# Patient Record
Sex: MALE | Race: WHITE | NOT HISPANIC OR LATINO | Employment: FULL TIME | URBAN - METROPOLITAN AREA
[De-identification: names, ages, dates, MRNs, and addresses within clinical notes are randomized per-mention and may not be internally consistent; named-entity substitution may affect disease eponyms.]

---

## 2022-08-03 ENCOUNTER — OFFICE VISIT (OUTPATIENT)
Dept: INTERNAL MEDICINE CLINIC | Facility: CLINIC | Age: 57
End: 2022-08-03
Payer: COMMERCIAL

## 2022-08-03 ENCOUNTER — TELEPHONE (OUTPATIENT)
Dept: INTERNAL MEDICINE CLINIC | Facility: CLINIC | Age: 57
End: 2022-08-03

## 2022-08-03 VITALS
BODY MASS INDEX: 28.55 KG/M2 | OXYGEN SATURATION: 100 % | SYSTOLIC BLOOD PRESSURE: 138 MMHG | DIASTOLIC BLOOD PRESSURE: 82 MMHG | WEIGHT: 215.4 LBS | HEART RATE: 67 BPM | HEIGHT: 73 IN | TEMPERATURE: 98.6 F

## 2022-08-03 DIAGNOSIS — D17.1 LIPOMA OF BACK: ICD-10-CM

## 2022-08-03 DIAGNOSIS — I87.2 VENOUS INSUFFICIENCY: ICD-10-CM

## 2022-08-03 DIAGNOSIS — I87.2 VENOUS INSUFFICIENCY: Primary | ICD-10-CM

## 2022-08-03 DIAGNOSIS — I83.93 ASYMPTOMATIC VARICOSE VEINS OF BOTH LOWER EXTREMITIES: ICD-10-CM

## 2022-08-03 DIAGNOSIS — I87.8 VENOUS STASIS OF BOTH LOWER EXTREMITIES: Primary | ICD-10-CM

## 2022-08-03 PROCEDURE — 99203 OFFICE O/P NEW LOW 30 MIN: CPT | Performed by: INTERNAL MEDICINE

## 2022-08-03 PROCEDURE — 3725F SCREEN DEPRESSION PERFORMED: CPT | Performed by: INTERNAL MEDICINE

## 2022-08-03 RX ORDER — PETROLATUM 0.61 G/G
CREAM TOPICAL DAILY
Qty: 397 G | Refills: 0 | Status: SHIPPED | OUTPATIENT
Start: 2022-08-03

## 2022-08-03 NOTE — ASSESSMENT & PLAN NOTE
Patient reports long history of 30+ years of Varicose Veins   Was evaluated by PCP at that time, no intervention  Denies pain       Plan-  Recommended compression stockings, instructed to wear upon awakening and remove before bed  Elevate legs when resting

## 2022-08-03 NOTE — ASSESSMENT & PLAN NOTE
Skin changes to the medial malleolus, darkening purple in color, sensation changes described as intermittent numbness  Denies ulcerations, pain, dry skin       Plan-  Recommended Eucerin cream daily to affected areas before the use of compression stockings  See Varicose Veins plan

## 2022-08-03 NOTE — PATIENT INSTRUCTIONS
Lipoma   WHAT YOU NEED TO KNOW:   A lipoma is a benign (non cancer) tumor made up of fat tissue  Lipomas can form anywhere in your body, but are usually found on the back, shoulders, neck, and head  The cause of lipomas is unknown  A lipoma looks like a round lump of tissue  It may feel soft and rubbery  Lipomas move around underneath your skin when you press on them  They usually do not hurt  If your lipoma grows large, it may cause pain  DISCHARGE INSTRUCTIONS:   Contact your healthcare provider if:   Your lipoma is getting bigger  Your lipoma causes new or increased pain  You develop new symptoms  You have questions or concerns about your condition or care  Follow up with your healthcare provider as directed: Your healthcare provider may recommend regular follow-up visits to check the lipoma for any changes  You may need tests if your lipoma becomes painful or changes in size or color  Write down your questions so you remember to ask them during your visits  © Copyright [employer] 2022 Information is for End User's use only and may not be sold, redistributed or otherwise used for commercial purposes  All illustrations and images included in CareNotes® are the copyrighted property of [employer]  or Angelina Patino   The above information is an  only  It is not intended as medical advice for individual conditions or treatments  Talk to your doctor, nurse or pharmacist before following any medical regimen to see if it is safe and effective for you

## 2022-08-03 NOTE — ASSESSMENT & PLAN NOTE
Long history of 10 years of L back lump   Denies changes in shape, pain, color changes, decreased shoulder rom         Plan-   Monitor for size changes or experiencing pain   If patient has more concerns, Surgery consult can be placed  Low suspicion for any other diagnosis other than Lipoma at this time

## 2022-08-03 NOTE — PROGRESS NOTES
INTERNAL MEDICINE INITIAL OFFICE VISIT  [facility] - [facility] INTERNAL MEDICINE [facility]    NAME: La Thomson  AGE: 55 y o   SEX: male  : 1965     DATE: 8/3/2022     Assessment and Plan:     Problem List Items Addressed This Visit        Cardiovascular and Mediastinum    Asymptomatic varicose veins of both lower extremities     Patient reports long history of 30+ years of Varicose Veins   Was evaluated by PCP at that time, no intervention  Denies pain       Plan-  Recommended compression stockings, instructed to wear upon awakening and remove before bed  Elevate legs when resting          Venous stasis of both lower extremities - Primary     Long history of Varicose Veins   R leg > L leg       Plan-   See Venous Insufficiency Plan          Relevant Medications    Skin Protectants, Misc  (eucerin) cream    Other Relevant Orders    Compression Stocking    Venous insufficiency     Skin changes to the medial malleolus, darkening purple in color, sensation changes described as intermittent numbness  Denies ulcerations, pain, dry skin       Plan-  Recommended Eucerin cream daily to affected areas before the use of compression stockings  See Varicose Veins plan              Other    Lipoma of back     Long history of 10 years of L back lump   Denies changes in shape, pain, color changes, decreased shoulder rom         Plan-   Monitor for size changes or experiencing pain   If patient has more concerns, Surgery consult can be placed  Low suspicion for any other diagnosis other than Lipoma at this time         BMI 28 0-28 9,adult     Current BMI 28 42  Has not seen a PCP in a few years       Plan-  Annual Physical in 1 month   Review labs- CBC, CMP, Lipid Panel, HbA1c, TSH  Recommended healthy lifestyle choices with diet and exercise         Relevant Orders    CBC and differential    Lipid Panel with Direct LDL reflex    Hemoglobin A1C    TSH, 3rd generation with Free T4 reflex    Comprehensive metabolic panel          Return in about 4 weeks (around 8/31/2022) for Annual physical      Chief Complaint:     Chief Complaint   Patient presents with   [address] on left shoulder for the past 10 years      History of Present Illness:     Patient was seen and examined in the office  Patient reported that he has not seen his PCP in a few years and would like to establish care as a new patient  Patient has a pmh of varicose veins for 30 years more prominent in the R leg  Patient stated that he has been experiencing R medial ankle skin color changes described as purple, intermittent swelling of the R foot, and sensation changes of intermittent numbness  Patient denies ulceration, pain, or any other sx  Patient was also concerned about a L lump on the back, which has been present for the past 10 years  Patient denies any changes in size, pain with rom or pain at all, redness, open skin, or any other sx at this time  The following portions of the patient's history were reviewed and updated as appropriate: allergies, current medications, past family history, past medical history, past social history, past surgical history and problem list      Review of Systems:     Review of Systems   Constitutional: Negative for chills and fever  HENT: Negative for ear pain and sore throat  Respiratory: Negative for cough, chest tightness and shortness of breath  Cardiovascular: Negative for chest pain and palpitations  Gastrointestinal: Negative for abdominal pain, diarrhea, nausea and vomiting  Genitourinary: Negative for dysuria, frequency, hematuria and urgency  Musculoskeletal: Negative for arthralgias and back pain  Skin: Positive for color change  Negative for rash  R medial ankle, purple in color  Denies ulceration, pain, dry skin  Varicose veins to the R leg  L lump on the left side of back  No changes in size or pain      Neurological: Negative for seizures and syncope  Psychiatric/Behavioral: Negative for confusion  All other systems reviewed and are negative  Past Medical History:     Past Medical History:   Diagnosis Date    Asymptomatic varicose veins of bilateral lower extremities 1987        Past Surgical History:   History reviewed  No pertinent surgical history  Sweat gland removal over 10 years ago     Social History:   He reports that he has never smoked  He has never used smokeless tobacco  He reports current alcohol use of about 20 0 standard drinks of alcohol per week  He reports that he does not use drugs  Family History:     Family History   Problem Relation Age of Onset    Diabetes Father     Diabetes Paternal Grandfather         Current Medications:     Current Outpatient Medications:     Skin Protectants, Misc  (eucerin) cream, Apply topically in the morning, Disp: 397 g, Rfl: 0     Allergies:   No Known Allergies     Physical Exam:     /82 (BP Location: Left arm, Patient Position: Sitting, Cuff Size: Standard)   Pulse 67   Temp 98 6 °F (37 °C) (Tympanic)   Ht 6' 1" (1 854 m)   Wt 97 7 kg (215 lb 6 4 oz)   SpO2 100%   BMI 28 42 kg/m²     Physical Exam  Constitutional:       General: He is not in acute distress  Appearance: Normal appearance  He is not toxic-appearing  HENT:      Head: Normocephalic and atraumatic  Mouth/Throat:      Mouth: Mucous membranes are moist    Eyes:      Extraocular Movements: Extraocular movements intact  Pupils: Pupils are equal, round, and reactive to light  Cardiovascular:      Rate and Rhythm: Normal rate and regular rhythm  Pulses: Normal pulses  Heart sounds: Normal heart sounds  Pulmonary:      Effort: Pulmonary effort is normal       Breath sounds: Normal breath sounds  Abdominal:      General: Bowel sounds are normal  There is no distension  Palpations: Abdomen is soft  Tenderness: There is no abdominal tenderness     Musculoskeletal:      Cervical back: Normal range of motion  Right lower leg: No edema  Left lower leg: No edema  Skin:     General: Skin is warm  Comments: R medial malleolus skin color changes, purple  No ulcerations or tenderness to palpation  Bilateral varicose veins R leg> L Leg, no tenderness to palpation    Neurological:      General: No focal deficit present  Mental Status: He is alert and oriented to person, place, and time  Motor: No weakness     Psychiatric:         Mood and Affect: Mood normal          Behavior: Behavior normal           Data:     Laboratory Results: None completed for this inital visit   Radiology/Other Diagnostic Testing Results: None completed prior to this intial visit    Ramy Head MD  [id number] [facility] [address]

## 2022-08-03 NOTE — TELEPHONE ENCOUNTER
Pt went to [facility] to get compression stockings , they need the order changed and has to have  cardio sign the script, not sure why dr Ayaz Olsen can not sign

## 2022-08-03 NOTE — ASSESSMENT & PLAN NOTE
Current BMI 28 42  Has not seen a PCP in a few years       Plan-  Annual Physical in 1 month   Review labs- CBC, CMP, Lipid Panel, HbA1c, TSH  Recommended healthy lifestyle choices with diet and exercise

## 2022-08-18 ENCOUNTER — TELEPHONE (OUTPATIENT)
Dept: INTERNAL MEDICINE CLINIC | Facility: CLINIC | Age: 57
End: 2022-08-18

## 2022-08-18 NOTE — TELEPHONE ENCOUNTER
I LM for Angie Mckeon to verify if he received the cream ordered and to let him know the cream OTC would be eucerin  It appears it transcribed to pharmacy as stephanie moist barrier cream but this product is off the market according to the pharamcy message we received  Please offer eucerin OTC

## 2022-09-13 ENCOUNTER — OFFICE VISIT (OUTPATIENT)
Dept: INTERNAL MEDICINE CLINIC | Facility: CLINIC | Age: 57
End: 2022-09-13
Payer: COMMERCIAL

## 2022-09-13 VITALS
DIASTOLIC BLOOD PRESSURE: 78 MMHG | OXYGEN SATURATION: 97 % | BODY MASS INDEX: 28.65 KG/M2 | HEART RATE: 69 BPM | SYSTOLIC BLOOD PRESSURE: 120 MMHG | WEIGHT: 216.2 LBS | TEMPERATURE: 97.8 F | HEIGHT: 73 IN

## 2022-09-13 DIAGNOSIS — Z12.5 SCREENING FOR PROSTATE CANCER: ICD-10-CM

## 2022-09-13 DIAGNOSIS — Z12.11 SCREENING FOR COLON CANCER: ICD-10-CM

## 2022-09-13 DIAGNOSIS — Z11.59 ENCOUNTER FOR HEPATITIS C SCREENING TEST FOR LOW RISK PATIENT: ICD-10-CM

## 2022-09-13 DIAGNOSIS — I83.893 SYMPTOMATIC VARICOSE VEINS, BILATERAL: ICD-10-CM

## 2022-09-13 DIAGNOSIS — Z11.4 ENCOUNTER FOR SCREENING FOR HIV: ICD-10-CM

## 2022-09-13 DIAGNOSIS — Z23 NEED FOR TDAP VACCINATION: ICD-10-CM

## 2022-09-13 DIAGNOSIS — Z00.00 ANNUAL PHYSICAL EXAM: Primary | ICD-10-CM

## 2022-09-13 PROCEDURE — 90471 IMMUNIZATION ADMIN: CPT

## 2022-09-13 PROCEDURE — 3725F SCREEN DEPRESSION PERFORMED: CPT | Performed by: INTERNAL MEDICINE

## 2022-09-13 PROCEDURE — 99396 PREV VISIT EST AGE 40-64: CPT | Performed by: INTERNAL MEDICINE

## 2022-09-13 PROCEDURE — 90715 TDAP VACCINE 7 YRS/> IM: CPT

## 2022-09-13 NOTE — ASSESSMENT & PLAN NOTE
Patient with 30 year history of bilateral varicose veins, more prominent on the right leg  He reports worsening of varicose veins over the years  Right leg significantly more affected  Right leg more swollen than the left  Patient denies any pain in his right leg or discoloration  Do not suspect DVT at this time due to right leg being more affected by varicose veins and venous stasis than the left leg  Discussed with patient the need to come back if he ever feels pain in his legs or any purplish, bulging, or discoloration of his right leg more than the left  Recommended to the patient that he should continue using compression stockings and remain more physically active  Will refer the patient to vascular surgery for further management

## 2022-09-13 NOTE — PROGRESS NOTES
INTERNAL MEDICINE HEALTH MAINTENANCE OFFICE VISIT  [facility] - [facility] INTERNAL MEDICINE [facility]    NAME: Marlena Thomson  AGE: 55 y o  SEX: male  : 1965     DATE: 2022     Assessment and Plan:     1  Annual physical exam    2  Need for Tdap vaccination  -     TDAP VACCINE GREATER THAN OR EQUAL TO 6YO IM    3  Symptomatic varicose veins, bilateral  Assessment & Plan:  Patient with 30 year history of bilateral varicose veins, more prominent on the right leg  He reports worsening of varicose veins over the years  Right leg significantly more affected  Right leg more swollen than the left  Patient denies any pain in his right leg or discoloration  Do not suspect DVT at this time due to right leg being more affected by varicose veins and venous stasis than the left leg  Discussed with patient the need to come back if he ever feels pain in his legs or any purplish, bulging, or discoloration of his right leg more than the left  Recommended to the patient that he should continue using compression stockings and remain more physically active  Will refer the patient to vascular surgery for further management  Orders:  -     Ambulatory Referral to Vascular Surgery; Future    4  Screening for prostate cancer  -     PSA, total and free; Future    5  Screening for colon cancer  -     Ambulatory referral for colonoscopy; Future    6  Encounter for screening for HIV  -     HIV 1/2 Antigen/Antibody (4th Generation) w Reflex [facility]; Future    7  Encounter for hepatitis C screening test for low risk patient  -     Hepatitis C antibody; Future      1  Patient's main concern was his bilateral varicose veins  2  Patient Counseling:  · Nutrition: Stressed importance of moderation in sodium/caffeine intake, saturated fat, trans fat and cholesterol  Discussed portion control as well as increasing intake of fruits, vegetables, lean protein, and fish     · Exercise: Stressed the importance of regular exercise at least 150 mins/week  · Sexuality: Discussed sexually transmitted diseases, partner selection, use of condoms, avoidance of unintended pregnancy  and contraceptive alternatives  · Injury prevention: Discussed safety belts, safety helmets, smoke detector, smoking near bedding or upholstery  · Dental health: Discussed importance of regular tooth brushing, flossing, and dental visits  · Immunizations reviewed  Will give Tdap vaccine  · Discussed benefits of screening  · Education was printed for patient    3  Discussed the patient's BMI with him  The BMI is above average; BMI management plan is completed    No follow-ups on file  Chief Complaint:     Chief Complaint   Patient presents with    Annual Exam        History of Present Illness: Well Adult Physical   Patient here for a comprehensive physical exam The patient reports problems - Bilateral venous stasis and varicose veins    Diet and Physical Activity  Diet: well balanced diet  Weight concerns: Patient is overweight (BMI 25 0-29  9)  Exercise: occasionally      Depression Screen  Negative for depression with PHQ2 score of 0  General Health  Hearing: Normal:  bilateral  Vision: no vision problems  Dental: no dental visits for >1 year        The following portions of the patient's history were reviewed and updated as appropriate: allergies, current medications, past family history, past medical history, past social history, past surgical history and problem list      Review of Systems:     Review of Systems   Constitutional: Negative for activity change, appetite change, fatigue and unexpected weight change  HENT: Negative for hearing loss  Eyes: Negative for visual disturbance  Respiratory: Negative for shortness of breath  Cardiovascular: Positive for leg swelling  Negative for chest pain and palpitations  Gastrointestinal: Negative for abdominal distention and abdominal pain     Genitourinary: Negative for difficulty urinating  Musculoskeletal: Negative for myalgias  Neurological: Negative for syncope and light-headedness  Past Medical History:     Past Medical History:   Diagnosis Date    Asymptomatic varicose veins of bilateral lower extremities 1987        Past Surgical History:     History reviewed  No pertinent surgical history  Social History:     Social History     Socioeconomic History    Marital status:      Spouse name: None    Number of children: None    Years of education: None    Highest education level: None   Occupational History    None   Tobacco Use    Smoking status: Never Smoker    Smokeless tobacco: Never Used   Vaping Use    Vaping Use: Never used   Substance and Sexual Activity    Alcohol use: Yes     Alcohol/week: 20 0 standard drinks     Types: 20 Cans of beer per week    Drug use: Never    Sexual activity: None   Other Topics Concern    None   Social History Narrative    None     Social Determinants of Health     Financial Resource Strain: Not on file   Food Insecurity: Not on file   Transportation Needs: Not on file   Physical Activity: Not on file   Stress: Not on file   Social Connections: Not on file   Intimate Partner Violence: Not on file   Housing Stability: Not on file        Family History:     Family History   Problem Relation Age of Onset    Diabetes Father     Diabetes Paternal Grandfather         Current Medications:     Outpatient Medications Prior to Visit   Medication Sig Dispense Refill    Skin Protectants, Misc  (eucerin) cream Apply topically in the morning 397 g 0     No facility-administered medications prior to visit  Allergies:     No Known Allergies     Objective:     Physical Exam:  General appearance: alert and oriented, in no acute distress  Head: Normocephalic, without obvious abnormality, atraumatic  Eyes: conjunctivae/corneas clear  PERRL, EOM's intact  Fundi benign    Ears: normal TM's and external ear canals both ears  Throat: abnormal findings: dentition: multiple carries  Lungs: clear to auscultation bilaterally  Heart: regular rate and rhythm, S1, S2 normal, no murmur, click, rub or gallop  Abdomen: normal findings: soft, non-tender  Extremities: varicose veins noted and venous stasis dermatitis noted  Pulses: 2+ and symmetric  Skin: varicosities - upper leg(s) bilateral, knee(s) bilateral, lower leg(s) bilateral, ankle(s) bilateral, feet bilateral    Data:    Laboratory Results: I have personally reviewed the pertinent laboratory results/reports   Radiology/Other Diagnostic Testing Results: I have personally reviewed pertinent reports         Health Maintenance:     Health Maintenance   Topic Date Due    Hepatitis C Screening  Never done    HIV Screening  Never done    BMI: Followup Plan  Never done    Annual Physical  Never done    Colorectal Cancer Screening  Never done    COVID-19 Vaccine (3 - Booster for María series) 04/15/2022    Influenza Vaccine (1) 09/01/2022    Depression Screening  08/03/2023    BMI: Adult  09/13/2023    DTaP,Tdap,and Td Vaccines (2 - Td or Tdap) 09/13/2032    Pneumococcal Vaccine: Pediatrics (0 to 5 Years) and At-Risk Patients (6 to 59 Years)  Aged Out    HIB Vaccine  Aged Out    Hepatitis B Vaccine  Aged Out    IPV Vaccine  Aged Out    Hepatitis A Vaccine  Aged Out    Meningococcal ACWY Vaccine  Aged Out    HPV Vaccine  Aged [employer] History   Administered Date(s) Administered    COVID-19 J&J (María) vaccine 0 5 mL 05/27/2021    COVID-19, unspecified 12/15/2021    Tdap 09/13/2022     Nutrition Assessment and Intervention:     Reviewed food recall journal      Physical Activity Assessment and Intervention:    Activity journal reviewed    Activity journal completion recommended    Physical Activity Prescription completed with patient      Other interventions: Biking or brisk walking 30 minutes 5 times a day    Emotional and Mental Well-being, Sleep, Connectedness Assessment and Intervention:    Depression and anxiety screening performed and reviewed    PHQ-9 or GAD7 performed for initial evaluation or follow-up      Tobacco and Toxic Substance Assessment and Intervention:     Tobacco use screening performed      Chandni Amaro MD  [facility] INTERNAL MEDICINE [address] [place] [address]

## 2022-09-13 NOTE — PROGRESS NOTES
[facility] INTERNAL MEDICINE [place]    NAME: Burton Moorehlrowan  AGE: 55 y o  SEX: male  : 1965     DATE: 2022     Assessment and Plan:     Problem List Items Addressed This Visit    None         Immunizations and preventive care screenings were discussed with patient today  Appropriate education was printed on patient's after visit summary  {Prostate cancer screening - consider in males between age of 43-69 depending on age, race, and risk factors  There is difference in the guidelines in regards to the optimal age for screening  USPSTF states to consider periodic screening in males between the age of 48 to 71  This text is informational and does not need to be selected but if you wish, you can insert standard documentation in your progress note by using F2 (Optional):43282}    Counseling:  · {Annual Physical; Counselin}         No follow-ups on file  Chief Complaint:     No chief complaint on file  History of Present Illness:     Adult Annual Physical   Patient here for a comprehensive physical exam  The patient reports {problems:77132}  Diet and Physical Activity  · Diet/Nutrition: {annual physical; diet:}  · Exercise: {annual physical; exercise:59719131}  Depression Screening  PHQ-2/9 Depression Screening         General Health  · Sleep: {annual physical; sleep:2102}  · Hearing: {annual physical; hearin}  · Vision: {annual physical; vision:}  · Dental: {annual physical; dental:}   Health  · Symptoms include: {annual physical; urinary symptoms:47870::"none"}     Review of Systems:     Review of Systems   Past Medical History:     Past Medical History:   Diagnosis Date    Asymptomatic varicose veins of bilateral lower extremities       Past Surgical History:     No past surgical history on file     Family History:     Family History   Problem Relation Age of Onset    Diabetes Father     Diabetes Paternal Grandfather       Social History:     Social History     Socioeconomic History    Marital status:      Spouse name: Not on file    Number of children: Not on file    Years of education: Not on file    Highest education level: Not on file   Occupational History    Not on file   Tobacco Use    Smoking status: Never Smoker    Smokeless tobacco: Never Used   Vaping Use    Vaping Use: Never used   Substance and Sexual Activity    Alcohol use: Yes     Alcohol/week: 20 0 standard drinks     Types: 20 Cans of beer per week    Drug use: Never    Sexual activity: Not on file   Other Topics Concern    Not on file   Social History Narrative    Not on file     Social Determinants of Health     Financial Resource Strain: Not on file   Food Insecurity: Not on file   Transportation Needs: Not on file   Physical Activity: Not on file   Stress: Not on file   Social Connections: Not on file   Intimate Partner Violence: Not on file   Housing Stability: Not on file      Current Medications:     Current Outpatient Medications   Medication Sig Dispense Refill    Skin Protectants, Misc  (eucerin) cream Apply topically in the morning 397 g 0     No current facility-administered medications for this visit  Allergies:     No Known Allergies   Physical Exam:     There were no vitals taken for this visit      Physical Exam     Aleksandr Pelletier MD  [facility] INTERNAL MEDICINE [place]

## 2022-10-18 ENCOUNTER — CONSULT (OUTPATIENT)
Dept: VASCULAR SURGERY | Facility: CLINIC | Age: 57
End: 2022-10-18
Payer: COMMERCIAL

## 2022-10-18 VITALS
HEIGHT: 73 IN | RESPIRATION RATE: 17 BRPM | DIASTOLIC BLOOD PRESSURE: 72 MMHG | WEIGHT: 211 LBS | BODY MASS INDEX: 27.96 KG/M2 | SYSTOLIC BLOOD PRESSURE: 124 MMHG | HEART RATE: 66 BPM

## 2022-10-18 DIAGNOSIS — I83.893 SYMPTOMATIC VARICOSE VEINS, BILATERAL: Primary | ICD-10-CM

## 2022-10-18 PROCEDURE — 99243 OFF/OP CNSLTJ NEW/EST LOW 30: CPT | Performed by: NURSE PRACTITIONER

## 2022-10-18 NOTE — PROGRESS NOTES
Assessment/Plan:   Problem List Items Addressed This Visit        Cardiovascular and Mediastinum    Symptomatic varicose veins, bilateral - Primary     72-year-old male without significant pertinent medical history presents with complaints of BLE dilated varicosities R>L x 30 years with RLE venous stasis changes  - patient reports 30 year history of RLE varicose veins  - patient has been wearing compression x 2 months   - RLE dilated truncal varicosities medial thigh to calf  - right medial foot with small dilated, purple varicosities impending rupture  - RLE venous stasis change  - no edema, drainage, or wound  - patient denies pain  - palpable distal pulses bilateral    Plan:   - conservative medical management with daily use of medical grade compression stockings 20-30 mmHg  On a m , off in p m    - frequent ambulation, leg elevation at rest  - bilateral LEVDR  - return to office with vascular surgeon after imaging to review and make further recommendations if indicated         Relevant Orders    VAS reflux lower limb venous duplex study with reflux assessment, complete bilateral           Diagnoses and all orders for this visit:    Symptomatic varicose veins, bilateral  -     Ambulatory Referral to Vascular Surgery  -     VAS reflux lower limb venous duplex study with reflux assessment, complete bilateral; Future    Other orders  -     Multiple Vitamins-Minerals (Multivitamin Adult, Minerals,) TABS; Take by mouth          Subjective:      Patient ID: Amita Oconnell is a 65 y o  male     Patient is new to our practice and was referred by Dr Sharona Goode  Pt c/o bulging veins in the RLE for the past 30 years  Pt states that his veins are getting worse  Pt denies pain, tiredness, heaviness, or itching  Pt denies hx of blood clots or bleeding veins  Pt has been wearing compression stockings for the past 2 months  Pt does elevate his legs        Chief Complaint: BLE varicose veins R>L      HPI  See assessment and plan  65yo M with varicose veins since age 26  He reports worsening over time  He recently relocated to the [facility] and with new job he stands for long hours of the day  Discussed pathophysiology of venous insufficiency and varicose veins  Recommend continued conservative management with daily use of medical grade compression stockings 20-30 mmHg  On a m , off in p m  Ovalles Umesh Frequent ambulation, leg elevation rest and diligent skin care  We will obtain lower extremity venous duplex with reflux and have patient return with vascular surgeon to review imaging and make further recommendations if indicated  Rahel Lauren is seen for evaluation of: [x]Varicose veins/legs  []Spider veins/legs  []Spider veins/face  []Venous stasis ulcer  []Superficial thrombophlebitis  []Other -      He complains of []none  [x]bulging veins  [x]dilated veins  []discolored veins         There is [x]no edema              []right leg edema  []left leg edema       []bilateral lower extremity edema     There is   []no leg pain          []right leg pain  []left leg pain         []bilateral leg pain  []bilateral leg pain; L>R   [x]bilateral leg pain; R>L     Pain is described as [x]aching              []itching  []sharp                []burning  []throbbing         []stinging  [x]heavy                []dull  []other -      Symptoms have been ongoing for:  30 years   There is  [x]no pertinent medical history  []history of DVT  []PE  []superficial venous thrombosis     Prior treatment includes []none  []EVLT  []OTC stockings  [x]prescription compression stockings  []vein ligation  []vein stripping  []stab phlebectomy  []sclerotherapy injections  []Other -      Current treatment includes []none  [x]compression socks  []avoiding tight clothing  []leg elevation  []rest  []exercise  []weight management  []skin care  []periodic evaluation   []Other-     Treatment has been []effective  [x]ineffective     Review of Systems   Constitutional: Negative  HENT: Negative  Eyes: Negative  Respiratory: Negative  Cardiovascular: Negative  Gastrointestinal: Negative  Endocrine: Negative  Genitourinary: Negative  Musculoskeletal: Negative  Skin: Negative  Allergic/Immunologic: Negative  Neurological: Negative  Hematological: Negative  Psychiatric/Behavioral: Negative  The following portions of the patient's history were reviewed and updated as appropriate: allergies, current medications, past family history, past medical history, past social history, past surgical history and problem list   Objective     Physical Exam  Vitals and nursing note reviewed  Constitutional:       Appearance: Normal appearance  He is normal weight  HENT:      Head: Normocephalic and atraumatic  Cardiovascular:      Rate and Rhythm: Normal rate  Pulses: Normal pulses  Dorsalis pedis pulses are 2+ on the right side and 2+ on the left side  Pulmonary:      Effort: Pulmonary effort is normal    Musculoskeletal:      Right lower leg: No edema  Left lower leg: No edema  Skin:     General: Skin is warm and dry  Capillary Refill: Capillary refill takes less than 2 seconds  Comments: RLE venous stasis changes    BLE large dilated truncal varicosities medial thigh to foot R>L  Right medial foot dilated varicosity x 2  impending rupture   Neurological:      Mental Status: He is alert and oriented to person, place, and time     Psychiatric:         Behavior: Behavior normal                       R medial foot- impending rupture            Objective:     Vitals:    10/18/22 1318   BP: 124/72   BP Location: Left arm   Patient Position: Sitting   Pulse: 66   Resp: 17   Weight: 95 7 kg (211 lb)   Height: 6' 1" (1 854 m)       Patient Active Problem List   Diagnosis   • Symptomatic varicose veins, bilateral   • Venous stasis of both lower extremities   • Venous insufficiency   • Lipoma of back   • BMI 28 0-28 9,adult History reviewed  No pertinent surgical history  Family History   Problem Relation Age of Onset   • Diabetes Father    • Diabetes Paternal Grandfather        Social History     Socioeconomic History   • Marital status:      Spouse name: Not on file   • Number of children: Not on file   • Years of education: Not on file   • Highest education level: Not on file   Occupational History   • Not on file   Tobacco Use   • Smoking status: Never Smoker   • Smokeless tobacco: Never Used   Vaping Use   • Vaping Use: Never used   Substance and Sexual Activity   • Alcohol use:  Yes     Alcohol/week: 20 0 standard drinks     Types: 20 Cans of beer per week   • Drug use: Never   • Sexual activity: Not on file   Other Topics Concern   • Not on file   Social History Narrative   • Not on file     Social Determinants of Health     Financial Resource Strain: Not on file   Food Insecurity: Not on file   Transportation Needs: Not on file   Physical Activity: Not on file   Stress: Not on file   Social Connections: Not on file   Intimate Partner Violence: Not on file   Housing Stability: Not on file       No Known Allergies      Current Outpatient Medications:   •  Multiple Vitamins-Minerals (Multivitamin Adult, Minerals,) TABS, Take by mouth, Disp: , Rfl:   •  Skin Protectants, Misc  (eucerin) cream, Apply topically in the morning (Patient not taking: Reported on 10/18/2022), Disp: 397 g, Rfl: 0    Vitals:    10/18/22 1318   BP: 124/72   Pulse: 66   Resp: 17

## 2022-10-18 NOTE — PATIENT INSTRUCTIONS
Conservative medical management with daily use of medical grade compression stockings 20-30 mmHg  On a m , off in p m  Frequent ambulation   Leg elevation at rest   Moisturizer and diligent skin care to maintain skin integrity and prevent skin breakdown (Eucerin)    SID bilateral   Return to office with vascular surgeon after imaging to review and make further recommendations if indicated      Varicose Veins   AMBULATORY CARE:   Varicose veins  are veins that become large, twisted, and swollen  They are common on the back of the calves, knees, and thighs  Varicose veins are caused by valves in your veins that do not work properly  This causes blood to collect and increase pressure in the veins of your legs  The increased pressure causes your veins to stretch, get larger, swell, and twist        Common symptoms include the following: Your symptoms may be worse after you stand or sit for long periods of time  You may have any of the following:  Blue, purple, or bulging veins in your legs     Pain, swelling, or muscle cramps in your legs    Feeling of fatigue or heaviness in your legs    Cramping in your legs    Seek care immediately if:   You have a wound that does not heal or is infected  You have an injury that has broken your skin and caused your varicose veins to bleed  Your leg is swollen and hard  You notice that your legs or feet are turning blue or black  Your leg feels warm, tender, and painful  It may look swollen and red  Contact your healthcare provider if:   You have pain in your leg that does not go away or gets worse  You notice sudden large bruising on your legs  You have a rash on your leg  Your symptoms keep you from doing your daily activities  You have questions or concerns about your condition or care  Treatment of varicose veins  aims to decrease symptoms, improve appearance, and prevent further problems   Treatment will depend on which veins are affected and how severe your condition is  You may need procedures to treat or remove your varicose veins  For example, your healthcare provider may inject a solution or use a laser to close the varicose veins  Surgery to remove long veins may also be done  Ask your healthcare provider for more information about procedures used to treat varicose veins  Manage varicose veins:   Do not sit or stand for long periods of time  This can cause the blood to collect in your legs and make your symptoms worse  Bend or rotate your ankles several times every hour  Walk around for a few minutes every hour to get blood moving in your legs  Do not cross your legs when you sit  This decreases blood flow to your feet and can make your symptoms worse  Do not wear tight clothing or shoes  Do not wear high-heeled shoes  Do not wear clothes that are tight around the waist or knees  Maintain a healthy weight  Being overweight or obese can make your varicose veins worse  Ask your healthcare provider how much you should weigh  Ask him or her to help you create a weight loss plan if you are overweight  Wear pressure stockings as directed  The stockings are tight and put pressure on your legs  They improve blood flow and help prevent clots  Elevate your legs  Keep them above the level of your heart for 15 to 30 minutes several times a day  You can also prop the end of your bed up slightly to elevate your legs while you sleep  This will help blood to flow back to your heart  Get regular exercise  Talk to your healthcare provider about the best exercise plan for you  Exercise can improve blood flow to your legs and feet  Follow up with your doctor as directed:  Write down your questions so you remember to ask them during your visits  © Copyright [employer] 2022 Information is for End User's use only and may not be sold, redistributed or otherwise used for commercial purposes   All illustrations and images included in [employer] are the copyrighted property of [employer]  or [facility] Nicole Patino   The above information is an  only  It is not intended as medical advice for individual conditions or treatments  Talk to your doctor, nurse or pharmacist before following any medical regimen to see if it is safe and effective for you

## 2022-10-18 NOTE — ASSESSMENT & PLAN NOTE
42-year-old male without significant pertinent medical history presents with complaints of BLE dilated varicosities R>L x 30 years with RLE venous stasis changes  - patient reports 30 year history of RLE varicose veins  - patient has been wearing compression x 2 months   - RLE dilated truncal varicosities medial thigh to calf  - right medial foot with small dilated, purple varicosities impending rupture  - RLE venous stasis change  - no edema, drainage, or wound  - patient denies pain  - palpable distal pulses bilateral    Plan:   - conservative medical management with daily use of medical grade compression stockings 20-30 mmHg    On a m , off in p m    - frequent ambulation, leg elevation at rest  - bilateral LEVDR  - return to office with vascular surgeon after imaging to review and make further recommendations if indicated

## 2022-12-14 ENCOUNTER — HOSPITAL ENCOUNTER (OUTPATIENT)
Dept: NON INVASIVE DIAGNOSTICS | Facility: CLINIC | Age: 57
Discharge: HOME | End: 2022-12-14

## 2022-12-14 DIAGNOSIS — I83.893 SYMPTOMATIC VARICOSE VEINS, BILATERAL: ICD-10-CM

## 2022-12-20 ENCOUNTER — OFFICE VISIT (OUTPATIENT)
Dept: VASCULAR SURGERY | Facility: CLINIC | Age: 57
End: 2022-12-20

## 2022-12-20 VITALS
HEIGHT: 73 IN | HEART RATE: 64 BPM | WEIGHT: 211 LBS | SYSTOLIC BLOOD PRESSURE: 120 MMHG | BODY MASS INDEX: 27.96 KG/M2 | DIASTOLIC BLOOD PRESSURE: 66 MMHG

## 2022-12-20 DIAGNOSIS — I83.893 SYMPTOMATIC VARICOSE VEINS, BILATERAL: Primary | ICD-10-CM

## 2022-12-20 NOTE — ASSESSMENT & PLAN NOTE
Large RLE varicose veins with minimal pain  Has right GSV reflux in thigh and is candidate for EVLT based on LEVDR    He feels symptoms are minimal and does not want to pursue surgery at this time  I did discuss his diagnosis with him in detail and offered him right GSV EVLT and phlebectomies  If he changes his mind he can be scheduled without a return office visit  LLE also has VV which are minimally symptomatic  He would also be a candidate for left GSV EVLT and phlebectomies if he chooses      Continue compression therapy and fu PRN